# Patient Record
Sex: FEMALE | Race: WHITE | NOT HISPANIC OR LATINO | Employment: FULL TIME | URBAN - METROPOLITAN AREA
[De-identification: names, ages, dates, MRNs, and addresses within clinical notes are randomized per-mention and may not be internally consistent; named-entity substitution may affect disease eponyms.]

---

## 2017-02-07 ENCOUNTER — TELEPHONE (OUTPATIENT)
Dept: INTERNAL MEDICINE | Facility: CLINIC | Age: 32
End: 2017-02-07

## 2017-02-07 NOTE — TELEPHONE ENCOUNTER
I spoke to pt who advised me that she has not been able to fill rx for zomig as it has needed a PA. Pt informed that I will start the authorization through the patients insurance. I will keep pt updated as more information is provided.

## 2017-02-07 NOTE — TELEPHONE ENCOUNTER
----- Message from Yoana Gillespie sent at 2/6/2017  9:44 AM EST -----  BERNICE SHE HAD TO R/S APPT FOR 02/07/2017 BECAUSE OF PERIOD. PLEASE CALL HER ABOUT [facility]. [phone].

## 2017-02-20 ENCOUNTER — TELEPHONE (OUTPATIENT)
Dept: INTERNAL MEDICINE | Facility: CLINIC | Age: 32
End: 2017-02-20

## 2017-02-20 NOTE — TELEPHONE ENCOUNTER
----- Message from Johana Balderas sent at 2/16/2017 10:57 AM EST -----  Contact: [phone]  Done! I also noticed patient wasn't on the schedule until April. When would you like to put her on?    ----- Message -----     From: Bertha Olsen MA     Sent: 2/16/2017  10:19 AM       To: Johana Balderas    Please let pt know per Dr. Coleman it's okay to keep appt next week and then we can go through everything at that appt. I will request records now to ensure that we get them in a timely manner. Thanks!     ----- Message -----     From: Johana Balderas     Sent: 2/15/2017   8:07 AM       To: Bertha Olsen MA    She was very dizzy/light headed and felt like something was sitting on her chest. She went to  ER where they checked her BP which was 149/101. The blood test and ekg was normal. She already has an appt with Dr. Coleman next week, but wants to know if she needs to come in sooner?

## 2017-02-20 NOTE — TELEPHONE ENCOUNTER
Per [facility] okay to put her on the scheduled on Wednesday morning as she has been waiting for this appointment. Pt informed and will come in then.

## 2017-02-22 ENCOUNTER — OFFICE VISIT (OUTPATIENT)
Dept: INTERNAL MEDICINE | Facility: CLINIC | Age: 32
End: 2017-02-22

## 2017-02-22 VITALS
DIASTOLIC BLOOD PRESSURE: 76 MMHG | WEIGHT: 278.8 LBS | SYSTOLIC BLOOD PRESSURE: 136 MMHG | HEIGHT: 65 IN | BODY MASS INDEX: 46.45 KG/M2 | TEMPERATURE: 98.2 F

## 2017-02-22 DIAGNOSIS — Z00.00 ANNUAL PHYSICAL EXAM: Primary | ICD-10-CM

## 2017-02-22 DIAGNOSIS — G43.909 MIGRAINE SYNDROME: ICD-10-CM

## 2017-02-22 DIAGNOSIS — L91.8 SKIN TAG: ICD-10-CM

## 2017-02-22 LAB
ALBUMIN SERPL-MCNC: 4.3 G/DL (ref 3.2–4.8)
ALBUMIN/GLOB SERPL: 1.6 G/DL (ref 1.5–2.5)
ALP SERPL-CCNC: 111 U/L (ref 25–100)
ALT SERPL-CCNC: 32 U/L (ref 7–40)
AST SERPL-CCNC: 22 U/L (ref 0–33)
BASOPHILS # BLD AUTO: 0.03 10*3/MM3 (ref 0–0.2)
BASOPHILS NFR BLD AUTO: 0.2 % (ref 0–1)
BILIRUB SERPL-MCNC: 0.4 MG/DL (ref 0.3–1.2)
BUN SERPL-MCNC: 6 MG/DL (ref 9–23)
BUN/CREAT SERPL: 10 (ref 7–25)
CALCIUM SERPL-MCNC: 9.3 MG/DL (ref 8.7–10.4)
CHLORIDE SERPL-SCNC: 107 MMOL/L (ref 99–109)
CHOLEST SERPL-MCNC: 191 MG/DL (ref 0–200)
CO2 SERPL-SCNC: 26 MMOL/L (ref 20–31)
CREAT SERPL-MCNC: 0.6 MG/DL (ref 0.6–1.3)
EOSINOPHIL # BLD AUTO: 0.39 10*3/MM3 (ref 0.1–0.3)
EOSINOPHIL NFR BLD AUTO: 2.5 % (ref 0–3)
ERYTHROCYTE [DISTWIDTH] IN BLOOD BY AUTOMATED COUNT: 14.3 % (ref 11.3–14.5)
GLOBULIN SER CALC-MCNC: 2.7 GM/DL
GLUCOSE SERPL-MCNC: 79 MG/DL (ref 70–100)
HCT VFR BLD AUTO: 40.4 % (ref 34.5–44)
HDLC SERPL-MCNC: 42 MG/DL (ref 40–60)
HGB BLD-MCNC: 13.5 G/DL (ref 11.5–15.5)
IMM GRANULOCYTES # BLD: 0.1 10*3/MM3 (ref 0–0.03)
IMM GRANULOCYTES NFR BLD: 0.6 % (ref 0–0.6)
LDLC SERPL CALC-MCNC: 128 MG/DL (ref 0–100)
LYMPHOCYTES # BLD AUTO: 3.26 10*3/MM3 (ref 0.6–4.8)
LYMPHOCYTES NFR BLD AUTO: 21.1 % (ref 24–44)
MCH RBC QN AUTO: 31 PG (ref 27–31)
MCHC RBC AUTO-ENTMCNC: 33.4 G/DL (ref 32–36)
MCV RBC AUTO: 92.9 FL (ref 80–99)
MONOCYTES # BLD AUTO: 0.85 10*3/MM3 (ref 0–1)
MONOCYTES NFR BLD AUTO: 5.5 % (ref 0–12)
NEUTROPHILS # BLD AUTO: 10.82 10*3/MM3 (ref 1.5–8.3)
NEUTROPHILS NFR BLD AUTO: 70.1 % (ref 41–71)
PLATELET # BLD AUTO: 339 10*3/MM3 (ref 150–450)
POTASSIUM SERPL-SCNC: 4.1 MMOL/L (ref 3.5–5.5)
PROT SERPL-MCNC: 7 G/DL (ref 5.7–8.2)
RBC # BLD AUTO: 4.35 10*6/MM3 (ref 3.89–5.14)
SODIUM SERPL-SCNC: 139 MMOL/L (ref 132–146)
TRIGL SERPL-MCNC: 105 MG/DL (ref 0–150)
TSH SERPL DL<=0.005 MIU/L-ACNC: 1.26 MIU/ML (ref 0.35–5.35)
VLDLC SERPL CALC-MCNC: 21 MG/DL
WBC # BLD AUTO: 15.45 10*3/MM3 (ref 3.5–10.8)

## 2017-02-22 PROCEDURE — 99395 PREV VISIT EST AGE 18-39: CPT | Performed by: FAMILY MEDICINE

## 2017-02-22 RX ORDER — RIZATRIPTAN BENZOATE 10 MG/1
TABLET ORAL
Qty: 9 TABLET | Refills: 5 | Status: SHIPPED | OUTPATIENT
Start: 2017-02-22 | End: 2018-03-14 | Stop reason: SDUPTHER

## 2017-02-22 NOTE — PROGRESS NOTES
Subjective   Eulalia Woodson is a 31 y.o. female. Here for a CPE and recheck migraine. Last seen 12/29/16 for routine 6mo recheck. Was last seen 7/27/16 for recheck Chantix and UTI (treated with Cipro). Was seen 1/18/16 for sinusitis, treated with biaxin. Pt seen 12/19/13 as a new patient. Lab 6/29/16 demo normal CBC (except WBC 16.9 during a dental infectoin), CMP (except glu 69) and TSH.     1. DERM- recurrent axillary boils. Pt has been treated prn with zmax, last was 12/29/16.   2. /GI- dysmenorrhea and GERD Has done well with duexis for her general pain, menstruation issues and GERD. Stopped it when she improved.    3.NEURO / SLEEP- migraine, shift work disorder- Pt works night and gets disrupted sleep due to her schedule with her children. Has Elavil to use prn. At last visit she was taking it as she was sleeping well. However, her migraines were no longer responding to maxalt. Was getting 2-3HA q3mo, lasting 2-3 days each. Was given trial with zomig. Today she reports that last week she woke up with an early migraine and took the zomig. Got flushed with jaw tightening and chest pressure. Went to the ER and had to stay for several hours before they could get a BP on her but otherwise her labs and EKG were normal.     4. DERM- pt has irritated skin tags around her neck.    4. CPE- Last CPE done: 10 yr ago  Last pap done: 10 yr ago. No h/o abnormal.  LMP: 2/1/17. No menstruation c/o. Contraception: not active, uses condoms when needed. Had a tubal ligation at 23yo after her 3rd child.  No fam hx breast ca.   No current vaginal, bladder or breast c/o.  Immunizations: Last TDaP: due. No previous pneumovax. No previous zostavax.    History of Present Illness     The following portions of the patient's history were reviewed and updated as appropriate: current medications, past family history, past medical history, past social history, past surgical history and problem list.    Review of Systems   Cardiovascular:  "Negative for chest pain.   Gastrointestinal: Negative for abdominal distention and abdominal pain.   Skin: Negative for color change.   Neurological: Negative for tremors, speech difficulty and headaches.   Psychiatric/Behavioral: Negative for agitation and confusion.   All other systems reviewed and are negative.        Current Outpatient Prescriptions:   •  amitriptyline (ELAVIL) 25 MG tablet, Take 25 mg by mouth every night. Take 1/2-1 tab hs prn, Disp: , Rfl:   •  cyclobenzaprine (FLEXERIL) 10 MG tablet, Take 1 tablet by mouth 3 (three) times a day as needed for muscle spasms., Disp: , Rfl:   •  famotidine (PEPCID) 40 MG tablet, Take 1 tab po qd prn stomach acid or reflux, Disp: 30 tablet, Rfl: 5  •  meloxicam (MOBIC) 15 MG tablet, Take 1 tablet by mouth daily as needed., Disp: , Rfl:   •  montelukast (SINGULAIR) 10 MG tablet, Take 1 tab po qd prn allergy, Disp: 30 tablet, Rfl: 0  •  rizatriptan (MAXALT) 10 MG tablet, Take 1 tab po prn migraine. May repeat in 2 hours if needed, max 3 in 24 hr, Disp: 9 tablet, Rfl: 5    Objective     Visit Vitals   • /76   • Temp 98.2 °F (36.8 °C)   • Ht 65\" (165.1 cm)   • Wt 278 lb 12.8 oz (126 kg)   • BMI 46.39 kg/m2       Physical Exam   Constitutional: She is oriented to person, place, and time. She appears well-developed and well-nourished.   HENT:   Head: Normocephalic.   Right Ear: Tympanic membrane and ear canal normal.   Left Ear: Tympanic membrane and ear canal normal.   Nose: Nose normal.   Mouth/Throat: Oropharynx is clear and moist.   Eyes: Conjunctivae and EOM are normal. Pupils are equal, round, and reactive to light.   Neck: Normal range of motion. Neck supple. No thyromegaly present.   Cardiovascular: Normal rate, regular rhythm and normal heart sounds.    Pulmonary/Chest: Effort normal and breath sounds normal. Right breast exhibits no mass, no nipple discharge and no skin change. Left breast exhibits no mass, no nipple discharge and no skin change. "   Abdominal: Soft. Bowel sounds are normal. She exhibits no distension. There is no tenderness.   Genitourinary: Vagina normal.   Musculoskeletal: Normal range of motion.   Lymphadenopathy:     She has no cervical adenopathy.   Neurological: She is alert and oriented to person, place, and time.   Skin: Skin is warm and dry.   Psychiatric: She has a normal mood and affect. Her behavior is normal.   Nursing note and vitals reviewed.      Reviewed the following with the patient: advised patient of need for:  immunizations discussed.    Assessment/Plan   Eulalia was seen today for annual exam.    Diagnoses and all orders for this visit:    Annual physical exam  -     Cytology, thin prep pap (cervical)  -     CBC & Differential  -     Comprehensive Metabolic Panel  -     Lipid Panel  -     TSH    Migraine syndrome  -     rizatriptan (MAXALT) 10 MG tablet; Take 1 tab po prn migraine. May repeat in 2 hours if needed, max 3 in 24 hr    Skin tag    Discussion today with patient regarding current guidelines for timing/ frequency of paps, mammograms, colonoscopy (or cologuard), bone density tests and lab tests.     Immunizations discussed today with current recommendations advised for DTaP, Zostavax, Pneumovax and Prevnar 13.    Appropriate diet and stretching discussed with handouts provided.    1. CPE- will do a pap today and then every 3yr. Not to start her mammo until 47yo. Will check into coverage for the tetanus shot. Will do labs today (pt had an oatmeal cream cookie). Encouraged smoking cessation.  2. NEURO- migraine- discussed that she had severe S/E with the zomig. Will return to maxalt as she tolerated this well and it did work well initially. If she has more issues with the HA, we will likely need to return to a preventive, ie- elavil.  3. RECHECK- 1yr CPE with routine. Will schedule skin tag snip excision

## 2017-02-22 NOTE — PATIENT INSTRUCTIONS
1. CPE- will do a pap today and then every 3yr. Not to start her mammo until 47yo. Will check into coverage for the tetanus shot. Will do labs today (pt had an oatmeal cream cookie).  2. NEURO- migraine- discussed that she had severe S/E with the zomig. Will return to maxal as she tolerated this well and it did work well initially. If she has more issues with the HA, we will likely need to return to a preventive, ie- elavil.  3. RECHECK- 1yr CPE with routine. Will schedule skin tag snip excision

## 2017-02-23 DIAGNOSIS — D72.829 LEUKOCYTOSIS, UNSPECIFIED TYPE: Primary | ICD-10-CM

## 2017-03-03 DIAGNOSIS — D72.829 LEUKOCYTOSIS, UNSPECIFIED TYPE: Primary | ICD-10-CM

## 2017-03-07 ENCOUNTER — HOSPITAL ENCOUNTER (OUTPATIENT)
Dept: GENERAL RADIOLOGY | Facility: HOSPITAL | Age: 32
Discharge: HOME | End: 2017-03-07
Attending: FAMILY MEDICINE | Admitting: FAMILY MEDICINE

## 2017-03-07 PROCEDURE — 71020 HC CHEST PA AND LATERAL: CPT

## 2017-03-23 ENCOUNTER — OFFICE VISIT (OUTPATIENT)
Dept: INTERNAL MEDICINE | Facility: CLINIC | Age: 32
End: 2017-03-23

## 2017-03-23 VITALS — HEIGHT: 65 IN | WEIGHT: 284.4 LBS | TEMPERATURE: 98.1 F | BODY MASS INDEX: 47.38 KG/M2

## 2017-03-23 DIAGNOSIS — L91.8 MULTIPLE ACQUIRED SKIN TAGS: Primary | ICD-10-CM

## 2017-03-23 PROCEDURE — 11200 RMVL SKIN TAGS UP TO&INC 15: CPT | Performed by: FAMILY MEDICINE

## 2017-03-23 NOTE — PROGRESS NOTES
"Subjective   Eulalia Woodson is a 31 y.o. female.     History of Present Illness   Here for neck skin tags snip excision. Last seen 2/22/17 for CPE.    DERM- today pt reports these are still hurting. Here for snip excision.     The following portions of the patient's history were reviewed and updated as appropriate: current medications, past family history, past medical history, past social history, past surgical history and problem list.    Review of Systems   Cardiovascular: Negative for chest pain.   Gastrointestinal: Negative for abdominal distention and abdominal pain.   Skin: Negative for color change.   Neurological: Negative for tremors, speech difficulty and headaches.   Psychiatric/Behavioral: Negative for agitation and confusion.   All other systems reviewed and are negative.        Current Outpatient Prescriptions:   •  amitriptyline (ELAVIL) 25 MG tablet, Take 25 mg by mouth every night. Take 1/2-1 tab hs prn, Disp: , Rfl:   •  cyclobenzaprine (FLEXERIL) 10 MG tablet, Take 1 tablet by mouth 3 (three) times a day as needed for muscle spasms., Disp: , Rfl:   •  famotidine (PEPCID) 40 MG tablet, Take 1 tab po qd prn stomach acid or reflux, Disp: 30 tablet, Rfl: 5  •  meloxicam (MOBIC) 15 MG tablet, Take 1 tablet by mouth daily as needed., Disp: , Rfl:   •  montelukast (SINGULAIR) 10 MG tablet, Take 1 tab po qd prn allergy, Disp: 30 tablet, Rfl: 0  •  rizatriptan (MAXALT) 10 MG tablet, Take 1 tab po prn migraine. May repeat in 2 hours if needed, max 3 in 24 hr, Disp: 9 tablet, Rfl: 5    Objective     Temp 98.1 °F (36.7 °C)  Ht 65\" (165.1 cm)  Wt 284 lb 6.4 oz (129 kg)  BMI 47.33 kg/m2    Physical Exam   Constitutional: She is oriented to person, place, and time. She appears well-developed and well-nourished.   HENT:   Right Ear: Tympanic membrane and ear canal normal.   Left Ear: Tympanic membrane and ear canal normal.   Mouth/Throat: Oropharynx is clear and moist.   Eyes: Conjunctivae and EOM are normal. " Pupils are equal, round, and reactive to light.   Neck: No thyromegaly present.   Cardiovascular: Normal rate and regular rhythm.    Pulmonary/Chest: Effort normal and breath sounds normal.   Neurological: She is alert and oriented to person, place, and time.   Skin: Skin is warm and dry.   Irritated neck skin tags   Psychiatric: She has a normal mood and affect.   Vitals reviewed.      Skin Tag Removal  Date/Time: 3/23/2017 4:14 PM  Performed by: ALEXANDRE ROSALES  Authorized by: ALEXANDRE ROSALES   Consent: Verbal consent obtained.  Consent given by: patient  Comments: No anesthesia required. Snip excision of tags. Hemostasis spontaneous. Pt tolerated well.          Assessment/Plan   Eulalia was seen today for follow-up.    Diagnoses and all orders for this visit:    Multiple acquired skin tags  -     Skin Tag Removal      1. DERM- skin tags, removed by snip excision today.   2. RECHECK- prn

## 2017-07-25 ENCOUNTER — TELEPHONE (OUTPATIENT)
Dept: INTERNAL MEDICINE | Facility: CLINIC | Age: 32
End: 2017-07-25

## 2017-09-13 ENCOUNTER — OFFICE VISIT (OUTPATIENT)
Dept: INTERNAL MEDICINE | Facility: CLINIC | Age: 32
End: 2017-09-13

## 2017-09-13 VITALS
SYSTOLIC BLOOD PRESSURE: 116 MMHG | DIASTOLIC BLOOD PRESSURE: 78 MMHG | BODY MASS INDEX: 47.88 KG/M2 | TEMPERATURE: 97.8 F | WEIGHT: 287.4 LBS | HEIGHT: 65 IN

## 2017-09-13 DIAGNOSIS — R60.0 LOCALIZED EDEMA: Primary | ICD-10-CM

## 2017-09-13 DIAGNOSIS — J06.9 ACUTE URI: ICD-10-CM

## 2017-09-13 DIAGNOSIS — M19.90 ARTHRITIS: ICD-10-CM

## 2017-09-13 PROCEDURE — 99214 OFFICE O/P EST MOD 30 MIN: CPT | Performed by: FAMILY MEDICINE

## 2017-09-13 PROCEDURE — 96372 THER/PROPH/DIAG INJ SC/IM: CPT | Performed by: FAMILY MEDICINE

## 2017-09-13 RX ORDER — TRIAMCINOLONE ACETONIDE 40 MG/ML
40 INJECTION, SUSPENSION INTRA-ARTICULAR; INTRAMUSCULAR ONCE
Status: COMPLETED | OUTPATIENT
Start: 2017-09-13 | End: 2017-09-13

## 2017-09-13 RX ORDER — FLUCONAZOLE 150 MG/1
150 TABLET ORAL ONCE
Qty: 1 TABLET | Refills: 0 | Status: SHIPPED | OUTPATIENT
Start: 2017-09-13 | End: 2017-09-13

## 2017-09-13 RX ADMIN — TRIAMCINOLONE ACETONIDE 40 MG: 40 INJECTION, SUSPENSION INTRA-ARTICULAR; INTRAMUSCULAR at 13:03

## 2017-09-13 NOTE — PROGRESS NOTES
Subjective   Eulalia Woodson is a 32 y.o. female.     History of Present Illness   Here for ankle swelling, possible pinched nerve. Last seen 3/23/17 for skin tag snip excision. Was seen 2/22/17 for CPE and recheck migraine. Was seen 7/27/16 for recheck Chantix and UTI (treated with Cipro). Was seen 1/18/16 for sinusitis, treated with biaxin. Pt seen 12/19/13 as a new patient. Lab 2/22/17 demo normal CBC (except WBC 15.45, improved from previuos 16.9 in 2016 during dental procedure), CMP, TSH and lipid with , tg 105, HDL 42, . Had CXR 2/22/17 due to elevated WBC, normal.     1. DERM- recurrent axillary boils. Pt has been treated prn with zmax, last was 12/29/16.   2. /GI- dysmenorrhea and GERD Has done well with duexis for her general pain, menstruation issues and GERD. Stopped it when she improved.  3.NEURO / SLEEP- migraine, shift work disorder- Pt works night and gets disrupted sleep due to her schedule with her children. Has Elavil to use prn. Also has maxalt for prn use.     4. ORTHO- h/o back pain, treated with ibu. No current issues at last discussion. Today pt reports this started 3mo ago. She was drinking a lot of pop and it improved some but she still has some on the right. Is on her feet all day. She is also having a sciatic flair now has been taking some ibu. This does help some but today she hurts particularly, especially as she has not been able to go to bed yet and she lifted a lot of crates at work yesterday.    5. RESP- ST with swollen glands. Today pt reports this started 2 days ago. Started with left jaw pain and thought it related to eating taffy. However, today she is having more ST.    The following portions of the patient's history were reviewed and updated as appropriate: current medications, past family history, past medical history, past social history, past surgical history and problem list.    Review of Systems   HENT: Positive for sore throat and trouble swallowing.   "  Cardiovascular: Negative for chest pain.   Gastrointestinal: Negative for abdominal distention and abdominal pain.   Musculoskeletal:        Feet and ankles swelling   Skin: Negative for color change.   Neurological: Negative for tremors, speech difficulty and headaches.   Psychiatric/Behavioral: Negative for agitation and confusion.   All other systems reviewed and are negative.        Current Outpatient Prescriptions:   •  amitriptyline (ELAVIL) 25 MG tablet, Take 25 mg by mouth every night. Take 1/2-1 tab hs prn, Disp: , Rfl:   •  clarithromycin XL (BIAXIN XL) 500 MG 24 hr tablet, Take 2 tablets by mouth Daily., Disp: 14 tablet, Rfl: 0  •  cyclobenzaprine (FLEXERIL) 10 MG tablet, Take 1 tablet by mouth 3 (three) times a day as needed for muscle spasms., Disp: , Rfl:   •  famotidine (PEPCID) 40 MG tablet, Take 1 tab po qd prn stomach acid or reflux, Disp: 30 tablet, Rfl: 5  •  fluconazole (DIFLUCAN) 150 MG tablet, Take 1 tablet by mouth 1 (One) Time for 1 dose., Disp: 1 tablet, Rfl: 0  •  meloxicam (MOBIC) 15 MG tablet, Take 1 tablet by mouth daily as needed., Disp: , Rfl:   •  montelukast (SINGULAIR) 10 MG tablet, Take 1 tab po qd prn allergy, Disp: 30 tablet, Rfl: 0  •  rizatriptan (MAXALT) 10 MG tablet, Take 1 tab po prn migraine. May repeat in 2 hours if needed, max 3 in 24 hr, Disp: 9 tablet, Rfl: 5    Current Facility-Administered Medications:   •  triamcinolone acetonide (KENALOG-40) injection 40 mg, 40 mg, Intramuscular, Once, Martha Coleman MD    Objective     /78  Temp 97.8 °F (36.6 °C)  Ht 65\" (165.1 cm)  Wt 287 lb 6.4 oz (130 kg)  BMI 47.83 kg/m2    Physical Exam   Constitutional: She is oriented to person, place, and time. She appears well-developed and well-nourished.   HENT:   Right Ear: Tympanic membrane and ear canal normal.   Left Ear: Tympanic membrane and ear canal normal.   Mouth/Throat: Oropharynx is clear and moist.   Eyes: Conjunctivae and EOM are normal. Pupils are equal, " round, and reactive to light.   Left TM normal. No TMJ pain   Neck: No thyromegaly present.   Cardiovascular: Normal rate and regular rhythm.    No varicose veins. Minor swelling   Pulmonary/Chest: Effort normal and breath sounds normal.   Neurological: She is alert and oriented to person, place, and time.   Skin: Skin is warm and dry.   Psychiatric: She has a normal mood and affect.   Vitals reviewed.      Assessment/Plan   Eulalia was seen today for follow-up.    Diagnoses and all orders for this visit:    Localized edema    Arthritis  -     triamcinolone acetonide (KENALOG-40) injection 40 mg; Inject 1 mL into the shoulder, thigh, or buttocks 1 (One) Time.    Acute URI  -     clarithromycin XL (BIAXIN XL) 500 MG 24 hr tablet; Take 2 tablets by mouth Daily.  -     fluconazole (DIFLUCAN) 150 MG tablet; Take 1 tablet by mouth 1 (One) Time for 1 dose.        1. EDEMA- discussed that this is circulation that was worse while drinking soda with a lot of sugar and salt. No treatment other than remain off the soda and wear support hose when able to at work for the long hours of standing.  2. ORTHO- arthritis with scitica flair. Will treat with kenalog shot now.  3. RESP- URI. Discussed that this is currently viral. Symptoms should improve but due to the steroid shot she may evolve into a sinus infection. Will write for biaxin to take in a few days if this turns into a full sinus infection. Will cover with diflucan.  4. RECHECK- prn

## 2017-09-13 NOTE — PATIENT INSTRUCTIONS
1. EDEMA- discussed that this is circulation that was worse while drinking soda with a lot of sugar and salt. No treatment other than remain off the soda and wear support hose when able to at work for the long hours of standing.  2. ORTHO- arthritis with scitica flair. Will treat with kenalog shot now.  3. RESP- URI. Discussed that this is currently viral. Symptoms should improve but due to the steroid shot she may evolve into a sinus infection. Will write for biaxin to take in a few days if this turns into a full sinus infection. Will cover with diflucan.  4. RECHECK- prn

## 2017-10-03 ENCOUNTER — APPOINTMENT (OUTPATIENT)
Dept: GENERAL RADIOLOGY | Facility: HOSPITAL | Age: 32
End: 2017-10-03

## 2017-10-03 ENCOUNTER — HOSPITAL ENCOUNTER (EMERGENCY)
Facility: HOSPITAL | Age: 32
Discharge: HOME | End: 2017-10-03
Attending: EMERGENCY MEDICINE | Admitting: EMERGENCY MEDICINE

## 2017-10-03 VITALS
DIASTOLIC BLOOD PRESSURE: 110 MMHG | HEART RATE: 103 BPM | TEMPERATURE: 98.3 F | SYSTOLIC BLOOD PRESSURE: 177 MMHG | RESPIRATION RATE: 16 BRPM | WEIGHT: 285 LBS | HEIGHT: 65 IN | OXYGEN SATURATION: 96 % | BODY MASS INDEX: 47.48 KG/M2

## 2017-10-03 DIAGNOSIS — M77.50 TENDONITIS OF FOOT: Primary | ICD-10-CM

## 2017-10-03 PROCEDURE — 73630 X-RAY EXAM OF FOOT: CPT

## 2017-10-03 PROCEDURE — 99282 EMERGENCY DEPT VISIT SF MDM: CPT

## 2017-10-03 RX ORDER — DICLOFENAC POTASSIUM 50 MG/1
50 TABLET, FILM COATED ORAL 3 TIMES DAILY
Qty: 15 TABLET | Refills: 0 | Status: SHIPPED | OUTPATIENT
Start: 2017-10-03 | End: 2017-10-17
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
## 2017-10-17 ENCOUNTER — OFFICE VISIT (OUTPATIENT)
Dept: INTERNAL MEDICINE | Facility: CLINIC | Age: 32
End: 2017-10-17

## 2017-10-17 VITALS
TEMPERATURE: 97.5 F | HEIGHT: 65 IN | WEIGHT: 287 LBS | BODY MASS INDEX: 47.82 KG/M2 | DIASTOLIC BLOOD PRESSURE: 84 MMHG | SYSTOLIC BLOOD PRESSURE: 142 MMHG

## 2017-10-17 DIAGNOSIS — M67.472 GANGLION CYST OF LEFT FOOT: Primary | ICD-10-CM

## 2017-10-17 PROCEDURE — 99213 OFFICE O/P EST LOW 20 MIN: CPT | Performed by: FAMILY MEDICINE

## 2017-10-17 RX ORDER — IBUPROFEN 800 MG/1
800 TABLET ORAL EVERY 6 HOURS PRN
Qty: 90 TABLET | Refills: 1 | Status: SHIPPED | OUTPATIENT
Start: 2017-10-17

## 2017-10-17 NOTE — PATIENT INSTRUCTIONS
1. ORTHO- ganglion cyst. Education re the pathophysiology provided. Discussed that she likely injured it and did not realize it until it swelled and gave her enough pain. Already healing, continue the ibu with RF today. Pt reminded to not take more than one type of NSAID on any given day. Discussed that the elevated BP was situational.   2. RECHECK- prn

## 2017-10-17 NOTE — PROGRESS NOTES
Subjective   Eulalia Woodson is a 32 y.o. female.     History of Present Illness   Here for recheck tendonitis in foot; pt seen in ED 10/3/17. Last seen by me 9/13/17 for sciatica and sinusitis; treated with kenalog and biaxin. Pt was seen 3/23/17 for skin tag snip excision. Was seen 2/22/17 for CPE and recheck migraine. Was seen 7/27/16 for recheck Chantix and UTI (treated with Cipro). Was seen 1/18/16 for sinusitis, treated with biaxin. Pt seen 12/19/13 as a new patient. Lab 2/22/17 demo normal CBC (except WBC 15.45, improved from previuos 16.9 in 2016 during dental procedure), CMP, TSH and lipid with , tg 105, HDL 42, . Had CXR 2/22/17 due to elevated WBC, normal.     1. DERM- recurrent axillary boils. Pt has been treated prn with zmax, last was 12/29/16.   2. NEURO / SLEEP- migraine, shift work disorder- Pt works night and gets disrupted sleep due to her schedule with her children. Has Elavil and maxalt for prn use.      4. ORTHO- her for recheck foot tendonitis. Pt has h/o back pain, treated with ibu. PT was seen 9/13/17 c/o right ankle swelling and sciatica, related to standing all day at work and recent heaving lifting of crates. Was still using ibu prn. Was treated with kenalog. Pt went to ER 10/3/17; note reviewed with diagnosis of tensynovitis and pt given diclofenac and was advised ortho. Today pt reports that her left foot started hurting 9/29/17 and after 4-5 days it was so painful that she went to the ER. Had xrays that were neg. She was given a boot. Her insurance did not cover the diclofenac so she used her ibu. Has ibu and mobic that she alternates. Could not wear the boot at work but did go back to work after 2 days. Is improving. Still has a minor bump on the lateral aspect of her foot but it does not hurt. Pt did not need to got to ortho. Is here just for f/u. Had high BP while at ER. No h/o elevated BP.    The following portions of the patient's history were reviewed and updated as  "appropriate: current medications, past family history, past medical history, past social history, past surgical history and problem list.    Review of Systems   Cardiovascular: Negative for chest pain.   Gastrointestinal: Negative for abdominal distention and abdominal pain.   Skin: Negative for color change.   Neurological: Negative for tremors, speech difficulty and headaches.   Psychiatric/Behavioral: Negative for agitation and confusion.   All other systems reviewed and are negative.        Current Outpatient Prescriptions:   •  amitriptyline (ELAVIL) 25 MG tablet, Take 25 mg by mouth every night. Take 1/2-1 tab hs prn, Disp: , Rfl:   •  cyclobenzaprine (FLEXERIL) 10 MG tablet, Take 1 tablet by mouth 3 (three) times a day as needed for muscle spasms., Disp: , Rfl:   •  famotidine (PEPCID) 40 MG tablet, Take 1 tab po qd prn stomach acid or reflux, Disp: 30 tablet, Rfl: 5  •  ibuprofen (ADVIL,MOTRIN) 800 MG tablet, Take 1 tablet by mouth Every 6 (Six) Hours As Needed (pain or inflammation)., Disp: 90 tablet, Rfl: 1  •  meloxicam (MOBIC) 15 MG tablet, Take 1 tablet by mouth daily as needed., Disp: , Rfl:   •  montelukast (SINGULAIR) 10 MG tablet, Take 1 tab po qd prn allergy, Disp: 30 tablet, Rfl: 0  •  rizatriptan (MAXALT) 10 MG tablet, Take 1 tab po prn migraine. May repeat in 2 hours if needed, max 3 in 24 hr, Disp: 9 tablet, Rfl: 5    Objective     /84 (BP Location: Left arm, Patient Position: Sitting, Cuff Size: Large Adult)  Temp 97.5 °F (36.4 °C) (Temporal Artery )   Ht 65\" (165.1 cm)  Wt 287 lb (130 kg)  BMI 47.76 kg/m2    Physical Exam   Constitutional: She is oriented to person, place, and time. She appears well-developed and well-nourished.   HENT:   Right Ear: Tympanic membrane and ear canal normal.   Left Ear: Tympanic membrane and ear canal normal.   Mouth/Throat: Oropharynx is clear and moist.   Eyes: Conjunctivae and EOM are normal. Pupils are equal, round, and reactive to light.   Neck: " No thyromegaly present.   Cardiovascular: Normal rate and regular rhythm.    Pulmonary/Chest: Effort normal and breath sounds normal.   Neurological: She is alert and oriented to person, place, and time.   Skin: Skin is warm and dry.   Psychiatric: She has a normal mood and affect.   Vitals reviewed.      Assessment/Plan   Eulalia was seen today for foot pain.    Diagnoses and all orders for this visit:    Ganglion cyst of left foot  -     ibuprofen (ADVIL,MOTRIN) 800 MG tablet; Take 1 tablet by mouth Every 6 (Six) Hours As Needed (pain or inflammation).      1. ORTHO- ganglion cyst. Education re the pathophysiology provided. Discussed that she likely injured it and did not realize it until it swelled and gave her enough pain. Already healing, continue the ibu with RF today. Pt reminded to not take more than one type of NSAID on any given day. Discussed that the elevated BP was situational.   2. RECHECK- prn

## 2018-01-05 ENCOUNTER — OFFICE VISIT (OUTPATIENT)
Dept: INTERNAL MEDICINE | Facility: CLINIC | Age: 33
End: 2018-01-05

## 2018-01-05 VITALS
HEIGHT: 65 IN | OXYGEN SATURATION: 99 % | HEART RATE: 78 BPM | DIASTOLIC BLOOD PRESSURE: 72 MMHG | BODY MASS INDEX: 46.98 KG/M2 | WEIGHT: 282 LBS | RESPIRATION RATE: 20 BRPM | SYSTOLIC BLOOD PRESSURE: 122 MMHG

## 2018-01-05 DIAGNOSIS — J01.90 ACUTE BACTERIAL SINUSITIS: Primary | ICD-10-CM

## 2018-01-05 DIAGNOSIS — B96.89 ACUTE BACTERIAL SINUSITIS: Primary | ICD-10-CM

## 2018-01-05 PROCEDURE — 99213 OFFICE O/P EST LOW 20 MIN: CPT | Performed by: FAMILY MEDICINE

## 2018-01-05 NOTE — PATIENT INSTRUCTIONS
1. RESP- sinusitis- discussed that she likely has a cold and a sinus infection. Will treat with biaxin. Pt to push fluids. OOW today.  2. RECHECK- prn

## 2018-01-05 NOTE — PROGRESS NOTES
Subjective   Eulalia Woodson is a 32 y.o. female.     History of Present Illness   Here today as a work in for cough and congestion. Last seen 10/17/17 for recheck tendonitis. Was seen 9/13/17 for sciatica and sinusitis, treated with kenalog and biaxin.    RESP- today pt reports she has been sick for 2wk, worse for the past week. Having head and chest congestion. Nasal discharge is clear. No fever but having low temp. She did not do well with the kenalog but did well with the biaxin.    The following portions of the patient's history were reviewed and updated as appropriate: allergies, past family history, past medical history, past social history, past surgical history and problem list.    Review of Systems   Constitutional: Positive for chills.   HENT: Positive for congestion.    Respiratory: Positive for cough.    Cardiovascular: Negative for chest pain.   Gastrointestinal: Positive for nausea and vomiting. Negative for abdominal distention and abdominal pain.   Skin: Negative for color change.   Neurological: Negative for tremors, speech difficulty and headaches.   Psychiatric/Behavioral: Negative for agitation and confusion.   All other systems reviewed and are negative.        Current Outpatient Prescriptions:   •  amitriptyline (ELAVIL) 25 MG tablet, Take 25 mg by mouth every night. Take 1/2-1 tab hs prn, Disp: , Rfl:   •  cyclobenzaprine (FLEXERIL) 10 MG tablet, Take 1 tablet by mouth 3 (three) times a day as needed for muscle spasms., Disp: , Rfl:   •  famotidine (PEPCID) 40 MG tablet, Take 1 tab po qd prn stomach acid or reflux, Disp: 30 tablet, Rfl: 5  •  ibuprofen (ADVIL,MOTRIN) 800 MG tablet, Take 1 tablet by mouth Every 6 (Six) Hours As Needed (pain or inflammation)., Disp: 90 tablet, Rfl: 1  •  meloxicam (MOBIC) 15 MG tablet, Take 1 tablet by mouth daily as needed., Disp: , Rfl:   •  montelukast (SINGULAIR) 10 MG tablet, Take 1 tab po qd prn allergy, Disp: 30 tablet, Rfl: 0  •  rizatriptan (MAXALT) 10 MG  "tablet, Take 1 tab po prn migraine. May repeat in 2 hours if needed, max 3 in 24 hr, Disp: 9 tablet, Rfl: 5  •  clarithromycin XL (BIAXIN XL) 500 MG 24 hr tablet, Take 2 tablets by mouth Daily., Disp: 14 tablet, Rfl: 0    Objective     /72  Pulse 78  Resp 20  Ht 165.1 cm (65\")  Wt 128 kg (282 lb)  LMP 12/27/2017  SpO2 99%  BMI 46.93 kg/m2    Physical Exam   Constitutional: She is oriented to person, place, and time. She appears well-developed and well-nourished.   HENT:   Right Ear: Tympanic membrane and ear canal normal.   Left Ear: Tympanic membrane and ear canal normal.   Mouth/Throat: Oropharynx is clear and moist.   Eyes: Conjunctivae and EOM are normal. Pupils are equal, round, and reactive to light.   Neck: No thyromegaly present.   Cardiovascular: Normal rate and regular rhythm.    Pulmonary/Chest: Effort normal and breath sounds normal.   Neurological: She is alert and oriented to person, place, and time.   Skin: Skin is warm and dry.   Psychiatric: She has a normal mood and affect.   Vitals reviewed.      Assessment/Plan   Eulalia was seen today for nausea and chills.    Diagnoses and all orders for this visit:    Acute bacterial sinusitis  -     clarithromycin XL (BIAXIN XL) 500 MG 24 hr tablet; Take 2 tablets by mouth Daily.        1. RESP- sinusitis- discussed that she likely has a cold and a sinus infection. Will treat with biaxin. Pt to push fluids. OOW today.  2. RECHECK- prn       "

## 2018-03-14 DIAGNOSIS — G43.909 MIGRAINE SYNDROME: ICD-10-CM

## 2018-03-14 RX ORDER — RIZATRIPTAN BENZOATE 10 MG/1
TABLET ORAL
Qty: 9 TABLET | Refills: 4 | Status: SHIPPED | OUTPATIENT
Start: 2018-03-14 | End: 2018-07-31 | Stop reason: SDUPTHER

## 2018-07-31 ENCOUNTER — OFFICE VISIT (OUTPATIENT)
Dept: INTERNAL MEDICINE | Facility: CLINIC | Age: 33
End: 2018-07-31

## 2018-07-31 VITALS
SYSTOLIC BLOOD PRESSURE: 140 MMHG | DIASTOLIC BLOOD PRESSURE: 82 MMHG | BODY MASS INDEX: 48.58 KG/M2 | HEIGHT: 65 IN | WEIGHT: 291.6 LBS | TEMPERATURE: 97.8 F

## 2018-07-31 DIAGNOSIS — K21.9 GASTROESOPHAGEAL REFLUX DISEASE WITHOUT ESOPHAGITIS: ICD-10-CM

## 2018-07-31 DIAGNOSIS — Z00.00 ANNUAL PHYSICAL EXAM: Primary | ICD-10-CM

## 2018-07-31 DIAGNOSIS — M19.90 ARTHRITIS: ICD-10-CM

## 2018-07-31 DIAGNOSIS — G43.909 MIGRAINE SYNDROME: ICD-10-CM

## 2018-07-31 LAB
25(OH)D3+25(OH)D2 SERPL-MCNC: 23 NG/ML
ALBUMIN SERPL-MCNC: 4.09 G/DL (ref 3.2–4.8)
ALBUMIN/GLOB SERPL: 1.5 G/DL (ref 1.5–2.5)
ALP SERPL-CCNC: 109 U/L (ref 25–100)
ALT SERPL-CCNC: 29 U/L (ref 7–40)
AST SERPL-CCNC: 20 U/L (ref 0–33)
BASOPHILS # BLD AUTO: 0.05 10*3/MM3 (ref 0–0.2)
BASOPHILS NFR BLD AUTO: 0.3 % (ref 0–1)
BILIRUB SERPL-MCNC: 0.2 MG/DL (ref 0.3–1.2)
BUN SERPL-MCNC: 7 MG/DL (ref 9–23)
BUN/CREAT SERPL: 11.1 (ref 7–25)
CALCIUM SERPL-MCNC: 8.7 MG/DL (ref 8.7–10.4)
CHLORIDE SERPL-SCNC: 105 MMOL/L (ref 99–109)
CHOLEST SERPL-MCNC: 174 MG/DL (ref 0–200)
CO2 SERPL-SCNC: 25 MMOL/L (ref 20–31)
CREAT SERPL-MCNC: 0.63 MG/DL (ref 0.6–1.3)
DIFFERENTIAL COMMENT: NORMAL
EOSINOPHIL # BLD AUTO: 0.47 10*3/MM3 (ref 0–0.3)
EOSINOPHIL NFR BLD AUTO: 3 % (ref 0–3)
ERYTHROCYTE [DISTWIDTH] IN BLOOD BY AUTOMATED COUNT: 15.1 % (ref 11.3–14.5)
GLOBULIN SER CALC-MCNC: 2.7 GM/DL
GLUCOSE SERPL-MCNC: 106 MG/DL (ref 70–100)
HCT VFR BLD AUTO: 41.6 % (ref 34.5–44)
HDLC SERPL-MCNC: 40 MG/DL (ref 40–60)
HGB BLD-MCNC: 13.4 G/DL (ref 11.5–15.5)
IMM GRANULOCYTES # BLD: 0.18 10*3/MM3 (ref 0–0.03)
IMM GRANULOCYTES NFR BLD: 1.1 % (ref 0–0.6)
LDLC SERPL CALC-MCNC: 115 MG/DL (ref 0–100)
LYMPHOCYTES # BLD AUTO: 2.96 10*3/MM3 (ref 0.6–4.8)
LYMPHOCYTES NFR BLD AUTO: 18.7 % (ref 24–44)
MCH RBC QN AUTO: 29.8 PG (ref 27–31)
MCHC RBC AUTO-ENTMCNC: 32.2 G/DL (ref 32–36)
MCV RBC AUTO: 92.4 FL (ref 80–99)
MONOCYTES # BLD AUTO: 0.57 10*3/MM3 (ref 0–1)
MONOCYTES NFR BLD AUTO: 3.6 % (ref 0–12)
NEUTROPHILS # BLD AUTO: 11.75 10*3/MM3 (ref 1.5–8.3)
NEUTROPHILS NFR BLD AUTO: 74.4 % (ref 41–71)
PLATELET # BLD AUTO: 343 10*3/MM3 (ref 150–450)
PLATELET BLD QL SMEAR: NORMAL
POTASSIUM SERPL-SCNC: 3.7 MMOL/L (ref 3.5–5.5)
PROT SERPL-MCNC: 6.8 G/DL (ref 5.7–8.2)
RBC # BLD AUTO: 4.5 10*6/MM3 (ref 3.89–5.14)
RBC MORPH BLD: NORMAL
SODIUM SERPL-SCNC: 141 MMOL/L (ref 132–146)
TRIGL SERPL-MCNC: 94 MG/DL (ref 0–150)
TSH SERPL DL<=0.005 MIU/L-ACNC: 0.77 MIU/ML (ref 0.35–5.35)
VIT B12 SERPL-MCNC: 380 PG/ML (ref 211–911)
VLDLC SERPL CALC-MCNC: 18.8 MG/DL
WBC # BLD AUTO: 15.8 10*3/MM3 (ref 3.5–10.8)

## 2018-07-31 PROCEDURE — 99395 PREV VISIT EST AGE 18-39: CPT | Performed by: FAMILY MEDICINE

## 2018-07-31 RX ORDER — RIZATRIPTAN BENZOATE 10 MG/1
TABLET ORAL
Qty: 9 TABLET | Refills: 5 | Status: SHIPPED | OUTPATIENT
Start: 2018-07-31 | End: 2019-03-14 | Stop reason: SDUPTHER

## 2018-07-31 RX ORDER — FAMOTIDINE 40 MG/1
TABLET, FILM COATED ORAL
Qty: 30 TABLET | Refills: 5 | Status: SHIPPED | OUTPATIENT
Start: 2018-07-31

## 2018-07-31 RX ORDER — CYCLOBENZAPRINE HCL 10 MG
TABLET ORAL
Qty: 30 TABLET | Refills: 5 | Status: SHIPPED | OUTPATIENT
Start: 2018-07-31

## 2019-03-14 DIAGNOSIS — G43.909 MIGRAINE SYNDROME: ICD-10-CM

## 2019-03-14 RX ORDER — RIZATRIPTAN BENZOATE 10 MG/1
TABLET ORAL
Qty: 9 TABLET | Refills: 5 | Status: SHIPPED | OUTPATIENT
Start: 2019-03-14 | End: 2019-03-14 | Stop reason: SDUPTHER

## 2019-03-14 RX ORDER — RIZATRIPTAN BENZOATE 10 MG/1
TABLET ORAL
Qty: 9 TABLET | Refills: 5 | Status: SHIPPED | OUTPATIENT
Start: 2019-03-14